# Patient Record
Sex: FEMALE | Race: OTHER | URBAN - METROPOLITAN AREA
[De-identification: names, ages, dates, MRNs, and addresses within clinical notes are randomized per-mention and may not be internally consistent; named-entity substitution may affect disease eponyms.]

---

## 2017-06-14 ENCOUNTER — APPOINTMENT (RX ONLY)
Dept: URBAN - METROPOLITAN AREA CLINIC 76 | Facility: CLINIC | Age: 57
Setting detail: DERMATOLOGY
End: 2017-06-14

## 2017-06-14 DIAGNOSIS — L81.1 CHLOASMA: ICD-10-CM

## 2017-06-14 PROCEDURE — 99201: CPT

## 2017-06-14 PROCEDURE — ? COUNSELING

## 2017-06-14 PROCEDURE — ? TREATMENT REGIMEN

## 2017-06-14 ASSESSMENT — LOCATION SIMPLE DESCRIPTION DERM: LOCATION SIMPLE: RIGHT FOREHEAD

## 2017-06-14 ASSESSMENT — LOCATION ZONE DERM: LOCATION ZONE: FACE

## 2017-06-14 ASSESSMENT — LOCATION DETAILED DESCRIPTION DERM: LOCATION DETAILED: RIGHT INFERIOR MEDIAL FOREHEAD

## 2017-06-14 NOTE — PROCEDURE: TREATMENT REGIMEN
Detail Level: Simple
Plan: Discussed option of topical bleaching cream (discussed ses of prolonged use including atrophy due to topical steroids and permanent pigmentation with prolonged hydroquinone use). Patient to start with chemical peel and will discuss further treatment options after 3 x consecutive peels.

## 2017-10-18 ENCOUNTER — APPOINTMENT (RX ONLY)
Dept: URBAN - METROPOLITAN AREA CLINIC 76 | Facility: CLINIC | Age: 57
Setting detail: DERMATOLOGY
End: 2017-10-18

## 2017-10-18 DIAGNOSIS — Z41.9 ENCOUNTER FOR PROCEDURE FOR PURPOSES OTHER THAN REMEDYING HEALTH STATE, UNSPECIFIED: ICD-10-CM

## 2017-10-18 PROCEDURE — ? CHEMICAL PEEL

## 2017-10-18 ASSESSMENT — LOCATION DETAILED DESCRIPTION DERM: LOCATION DETAILED: LEFT INFERIOR CENTRAL MALAR CHEEK

## 2017-10-18 ASSESSMENT — LOCATION SIMPLE DESCRIPTION DERM: LOCATION SIMPLE: LEFT CHEEK

## 2017-10-18 ASSESSMENT — LOCATION ZONE DERM: LOCATION ZONE: FACE

## 2017-10-18 NOTE — PROCEDURE: CHEMICAL PEEL
Prep: The treated area was degreased with pre-peel cleanser, and vaseline was applied for protection of mucous membranes.
Treatment Number: 1
Post-Care Instructions: I reviewed with the patient in detail post-care instructions. Patient should avoid sun exposure and wear sun protection.
Price (Use Numbers Only, No Special Characters Or $): 100
Detail Level: Zone
Post Peel Care: After the procedure, a post-peel cream was applied to the treated areas. Sun protection and post-care instructions were reviewed with the patient.
Consent: Prior to the procedure, written consent was obtained and risks were reviewed, including but not limited to: redness, peeling, blistering, pigmentary change, scarring, infection, and pain.
Chemical Peel: TCA 25%

## 2017-12-06 ENCOUNTER — APPOINTMENT (RX ONLY)
Dept: URBAN - METROPOLITAN AREA CLINIC 76 | Facility: CLINIC | Age: 57
Setting detail: DERMATOLOGY
End: 2017-12-06

## 2017-12-06 DIAGNOSIS — Z41.9 ENCOUNTER FOR PROCEDURE FOR PURPOSES OTHER THAN REMEDYING HEALTH STATE, UNSPECIFIED: ICD-10-CM

## 2017-12-06 PROBLEM — J30.1 ALLERGIC RHINITIS DUE TO POLLEN: Status: ACTIVE | Noted: 2017-12-06

## 2017-12-06 PROCEDURE — ? CHEMICAL PEEL

## 2017-12-06 ASSESSMENT — LOCATION ZONE DERM: LOCATION ZONE: FACE

## 2017-12-06 ASSESSMENT — LOCATION SIMPLE DESCRIPTION DERM: LOCATION SIMPLE: LEFT CHEEK

## 2017-12-06 ASSESSMENT — LOCATION DETAILED DESCRIPTION DERM: LOCATION DETAILED: LEFT CENTRAL MALAR CHEEK

## 2017-12-06 NOTE — PROCEDURE: CHEMICAL PEEL
Post-Care Instructions: I reviewed with the patient in detail post-care instructions. Patient should avoid sun exposure and wear sun protection.
Price (Use Numbers Only, No Special Characters Or $): 100
Number Of Layers: 2
Consent: Prior to the procedure, written consent was obtained and risks were reviewed, including but not limited to: redness, peeling, blistering, pigmentary change, scarring, infection, and pain.
Detail Level: Zone
Prep: The treated area was degreased with pre-peel cleanser, and vaseline was applied for protection of mucous membranes.
Post Peel Care: After the procedure, a post-peel cream was applied to the treated areas. Sun protection and post-care instructions were reviewed with the patient.
Chemical Peel: TCA 25%

## 2018-01-10 ENCOUNTER — APPOINTMENT (RX ONLY)
Dept: URBAN - METROPOLITAN AREA CLINIC 76 | Facility: CLINIC | Age: 58
Setting detail: DERMATOLOGY
End: 2018-01-10

## 2018-01-10 DIAGNOSIS — L81.1 CHLOASMA: ICD-10-CM

## 2018-01-10 DIAGNOSIS — D18.0 HEMANGIOMA: ICD-10-CM

## 2018-01-10 PROBLEM — D18.01 HEMANGIOMA OF SKIN AND SUBCUTANEOUS TISSUE: Status: ACTIVE | Noted: 2018-01-10

## 2018-01-10 PROBLEM — L70.0 ACNE VULGARIS: Status: ACTIVE | Noted: 2018-01-10

## 2018-01-10 PROCEDURE — ? IN-HOUSE DISPENSING PHARMACY

## 2018-01-10 PROCEDURE — ? MEDICAL CONSULTATION: MELASMA

## 2018-01-10 PROCEDURE — ? TREATMENT REGIMEN

## 2018-01-10 PROCEDURE — 99212 OFFICE O/P EST SF 10 MIN: CPT

## 2018-01-10 PROCEDURE — ? COUNSELING

## 2018-01-10 ASSESSMENT — LOCATION SIMPLE DESCRIPTION DERM
LOCATION SIMPLE: RIGHT TEMPLE
LOCATION SIMPLE: RIGHT FOREHEAD
LOCATION SIMPLE: SUPERIOR FOREHEAD
LOCATION SIMPLE: LEFT FOREHEAD
LOCATION SIMPLE: UPPER BACK
LOCATION SIMPLE: LEFT TEMPLE
LOCATION SIMPLE: UPPER BACK

## 2018-01-10 ASSESSMENT — LOCATION DETAILED DESCRIPTION DERM
LOCATION DETAILED: SUPERIOR MID FOREHEAD
LOCATION DETAILED: SUPERIOR THORACIC SPINE
LOCATION DETAILED: RIGHT INFERIOR TEMPLE
LOCATION DETAILED: LEFT INFERIOR LATERAL FOREHEAD
LOCATION DETAILED: LEFT INFERIOR TEMPLE
LOCATION DETAILED: RIGHT SUPERIOR FOREHEAD
LOCATION DETAILED: RIGHT LATERAL FOREHEAD
LOCATION DETAILED: SUPERIOR THORACIC SPINE

## 2018-01-10 ASSESSMENT — LOCATION ZONE DERM
LOCATION ZONE: TRUNK
LOCATION ZONE: TRUNK
LOCATION ZONE: FACE

## 2018-01-10 NOTE — PROCEDURE: IN-HOUSE DISPENSING PHARMACY
Product 2 Amount/Unit (Numbers Only): 30
Product 43 Units Dispensed: 0
Product 12 Unit Type: ml
Render Refills If Set To 0: Yes
Product 75 Unit Type: mg
Name Of Product 12: [id number]- Kojic Melasma Cream\\nAscorbyl Palmitate 1%/ Kojic Acid 4%/ Lactic Acid 5%/ Potassium Azeloyl Diglycinate 10%
Product 3 Refills: 11
Product 5 Application Directions: Wash topically to acne prone areas 1-2 times daily
Product 12 Price/Unit (In Dollars): 40.00
Name Of Product 2: [id number]- Tret 0.05% Cream\\n Tretinoin 0.05% / Niacinamide 2%
Product 11 Refills: 2
Product 3 Amount/Unit (Numbers Only): 60
Product 31 Amount/Unit (Numbers Only): 30
Product 53 Price/Unit (In Dollars): 50.00
Product 2 Application Directions: Apply to affected area in the evening after moisturizer.  Avoid eyelids.
Name Of Product 21: [id number]- Ivermec 1% / Met 1% Gel\\nIvermectin 1% / Metronidazole 1% / Niacinamide 4% / Potassium Azeloyl Diglycinate 5%
Name Of Product 1: [id number]- Clind/Tret Combo Cream\\n clindamycin phosphate 1%/ Niacinamide 2%/ Tretinoin 0.025%
Name Of Product 4: [id number]- Acne Gel w/Dapsone\\nDapsone 7.5%/ Niacinamide 2%
Product 21 Refills: 11
Product 51 Unit Type: grams
Product 31 Refills: 1
Name Of Product 11: [id number]- Hydroquin 6% Combo Cream\\nHydroquinone 6%/ Tretinoin 0.05% / Traimcinolone Acetonide 0.025%
Name Of Product 3: [id number]- Sod Sulf 10% / Sulfur 2%\\nSodium Sulfacetamide 10%/ Sulfur 2%
Product 6 Application Directions: Apply topically to acne prone areas once daily at bedtime.
Product 51 Amount/Unit (Numbers Only): 60
Name Of Product 33: [id number]- Anti-fungal Nail Solution\\nTerbinafine 5% / DMSO 95%
Product 43 Refills: 3
Name Of Product 31: [id number]- Imiqui 5% / Levo 1% Gel\\nImiquimod 5% / Levocetirizine 1% / Niacinamide 2%
Product 43 Application Directions: Apply topically twice daily to active areas as directed.
Product 12 Application Directions: .Apply to affected areas once daily.
Product 53 Application Directions: Apply topically twice daily to active areas as directed
Name Of Product 5: 601077- Acne Body Wash\\nSodium sulfacetamide 8% / Sulfur 4%
Name Of Product 6: [id number]- Taza 0.1% Cream\\nNiacinamide 2% / Tazarotene 0.1%
Product 4 Application Directions: Apply topically to acne prone areas once daily.
Name Of Product 7: 062091- Beau/ Clind Combo Gel \\nBenxoyl Peroxide 5% / Clindamycin Phosphate 1%/ Niacinamide 2%
Product 43 Price/Unit (In Dollars): 45.00
Name Of Product 53: 885879- Tacro 0.1% Ointment\\nNiacinamide 4% / Tacrolimus 0.1%
Product 31 Application Directions: Apply topically once daily as directed
Name Of Product 51: 351089- Triamcin 1% Ointment \\nCalcipotriene 0.005% /  Triamcinolone Acetonide 0.1%
Product 5 Amount/Unit (Numbers Only): 120
Name Of Product 52: [id number]- Iodoquin / Jj Combo\\nAloe Vera 0.5% / Desonide 0.05%/ Iodoquinol 1%
Name Of Product 43: 998374- Clob 0.05% Ointment 60gm
Name Of Product 42: [id number]- Clob 0.05% Solution 60 mL\\nClobetasol Propionate 0.05% / Niacinamide 4%
Product 33 Application Directions: Apply topically to nails and nail folds once daily
Name Of Product 41: [id number]- Clob 0.05% Cream\\nClobetasol Propionate 0.05% / Niacinamide 2 %
Product 7 Application Directions: Apply to affected area one time a day in morning.
Product 11 Application Directions: Apply to affected areas once daily; take one month break after every 2 months of use.
Product 33 Amount/Unit (Numbers Only): 15
Detail Level: Zone
Name Of Product 8: 151156- Adap Combo Cream\\nAdapalene 0.3% / Benzoyl Peroxide 2.5% / Niacinamide 2%
Product 1 Application Directions: apply topically to acne prone areas once daily at bedtime
Product 21 Application Directions: Apply to affected area once or twice daily.

## 2018-03-13 ENCOUNTER — APPOINTMENT (RX ONLY)
Dept: URBAN - METROPOLITAN AREA CLINIC 76 | Facility: CLINIC | Age: 58
Setting detail: DERMATOLOGY
End: 2018-03-13

## 2018-03-13 DIAGNOSIS — L81.1 CHLOASMA: ICD-10-CM

## 2018-03-13 PROCEDURE — ? TREATMENT REGIMEN

## 2018-03-13 PROCEDURE — 99211 OFF/OP EST MAY X REQ PHY/QHP: CPT

## 2018-03-13 PROCEDURE — ? COUNSELING

## 2018-03-13 ASSESSMENT — LOCATION SIMPLE DESCRIPTION DERM
LOCATION SIMPLE: RIGHT TEMPLE
LOCATION SIMPLE: RIGHT FOREHEAD

## 2018-03-13 ASSESSMENT — LOCATION DETAILED DESCRIPTION DERM
LOCATION DETAILED: RIGHT MID TEMPLE
LOCATION DETAILED: RIGHT FOREHEAD

## 2018-03-13 ASSESSMENT — LOCATION ZONE DERM: LOCATION ZONE: FACE

## 2018-03-13 NOTE — PROCEDURE: TREATMENT REGIMEN
Plan: Patient to consider the micro-needling  will need 4-6 treatments and patient will check with Dr Stevens for laser treatment which will only be 1 for patient’s skin type
Detail Level: Simple
Continue Regimen: Stop the Hydroquinone for 1 month then resume for 2 months

## 2025-02-07 NOTE — PROCEDURE: TREATMENT REGIMEN
Detail Level: Simple
Plan: Patient to start 2 month trial of hydroquinone-tretinoin-triamcinolone mix twice daily. Discussed risk of hyperpigmentation, irritation and atrophy. Patient to return in 2 months for recheck prior to continuing given possible side effect profile. She may consider future microneedling and she may consider consultation with Dr. Prakash Stevens for discussion of other laser options. 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
Initial (On Arrival)